# Patient Record
Sex: FEMALE | Race: WHITE
[De-identification: names, ages, dates, MRNs, and addresses within clinical notes are randomized per-mention and may not be internally consistent; named-entity substitution may affect disease eponyms.]

---

## 2018-04-23 ENCOUNTER — HOSPITAL ENCOUNTER (EMERGENCY)
Dept: HOSPITAL 60 - LB.ED | Age: 53
Discharge: HOME | End: 2018-04-23
Payer: COMMERCIAL

## 2018-04-23 DIAGNOSIS — Z88.2: ICD-10-CM

## 2018-04-23 DIAGNOSIS — J01.90: ICD-10-CM

## 2018-04-23 DIAGNOSIS — Z88.1: ICD-10-CM

## 2018-04-23 DIAGNOSIS — R04.0: Primary | ICD-10-CM

## 2018-04-23 PROCEDURE — 99283 EMERGENCY DEPT VISIT LOW MDM: CPT

## 2018-04-23 PROCEDURE — 85025 COMPLETE CBC W/AUTO DIFF WBC: CPT

## 2018-04-23 PROCEDURE — 36415 COLL VENOUS BLD VENIPUNCTURE: CPT

## 2018-04-23 PROCEDURE — 30901 CONTROL OF NOSEBLEED: CPT

## 2018-04-23 NOTE — EDM.PDOC
ED HPI GENERAL MEDICAL PROBLEM





- General


Chief Complaint: ENT Problem


Stated Complaint: Nose Bleed


Time Seen by Provider: 04/23/18 12:25





- Related Data


 Allergies











Allergy/AdvReac Type Severity Reaction Status Date / Time


 


bacitracin Allergy  Hives Verified 04/23/18 12:41


 


sulfamethoxazole Allergy  Hives Verified 04/23/18 12:41





[From Bactrim]     


 


trimethoprim [From Bactrim] Allergy  Hives Verified 04/23/18 12:41











Home Meds: 


 Home Meds





Levofloxacin [Levaquin] 500 mg PO DAILY 10 Days #10 tab 04/23/18 [Rx]











ED ROS ENT





- Review of Systems


Review Of Systems: See Below


Constitutional: Reports: No Symptoms


HEENT: Reports: Nosebleed, Other (Sinus surgery about 4 weeks ago, green/grey 

exudate from sinus this am.)


Respiratory: Reports: No Symptoms


Cardiovascular: Reports: No Symptoms





ED EXAM, ENT





- Physical Exam


Exam: See Below


Exam Limited By: No Limitations


General Appearance: Alert, WD/WN, No Apparent Distress


Nose: Active Bleeding


Mouth/Throat: Normal Inspection, Normal Lips, Normal Oropharynx


Head: Atraumatic, Normocephalic


Neck: Supple, Non-Tender


Respiratory/Chest: No Respiratory Distress


Cardiovascular: Normal Peripheral Pulses


GI/Abdominal: Soft, Non-Tender


Neurological: Alert, Oriented, Normal Cognition, Normal Gait


Psychiatric: Normal Affect


Skin: Warm, Dry, Normal Color





Course





- Vital Signs


Last Recorded V/S: 


 Last Vital Signs











Temp  98.8 F   04/23/18 12:26


 


Pulse  74   04/23/18 13:42


 


Resp  18   04/23/18 13:42


 


BP  136/91 H  04/23/18 13:42


 


Pulse Ox  100   04/23/18 13:42














- Orders/Labs/Meds


Labs: 


 Laboratory Tests











  04/23/18 Range/Units





  13:40 


 


WBC  4.6  (4.0-11.0)  K/uL


 


RBC  4.24  (3.80-5.80)  M/uL


 


Hgb  12.7  (11.5-16.5)  g/dL


 


Hct  37.4  (37.0-47.0)  %


 


MCV  88  (76-96)  fL


 


MCH  30.0  (27.0-32.0)  pg


 


MCHC  34.0  (31.0-35.0)  g/dL


 


RDW  12.7  (11.0-16.0)  %


 


Plt Count  194  (150-500)  K/uL


 


MPV  9.3  (6.0-10.0)  fL


 


Neutrophils % (Manual)  64.0  (45.0-70.0)  %


 


Band Neutrophils %  2.0  %


 


Lymphocytes % (Manual)  24.0  (20.0-40.0)  %


 


Monocytes % (Manual)  8.0  (3.0-10.0)  %


 


Eosinophils % (Manual)  2.0  (1.0-5.0)  %














- Re-Assessments/Exams


Free Text/Narrative Re-Assessment/Exam: 





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  With use of sterile q-tips, 

pushed nasal clot to back of nares.  Saline flush X 2 used to nares.  Pt states 

she felt some of the clot relieved.  Ice applied to nose and pressure applied 

to bridge of nose.  Afrin 2 sprays to each nares used.  Consult w Dr Galloway.  Dr Galloway examined pt and applied nasal packing to right nares.  States to return 

to clinic tomorrow to have packing removed.  Pt had picture on cell phone of 

grey/green thick exudate, from nares this am.  Will get CBC now and start 

Levaquin 500 mg PO X 10 days for sinus infection.  Pt instructed to not blow 

nares today.  





04/23/18 14:54  LE


CBC normal will start Levaquin 1 tablet daily for 10 days.  Start empirically 

for sinus infection  Pt states understanding, discharged and in no acute 

distress.  Neighbor is with him and will drive her home.  She has been in 

contact with her  and ENT for her visit today.    Dr Galloway provided 

education for care of nasal bleeding at home with pinching nasal area for 2 

minutes and then use of Afrin 2 sprays to nostril every 20 minutes X 3.  If 

bleeding persists present to ER for assist.














Departure





- Departure


Time of Disposition: 13:52


Disposition: Home, Self-Care 01


Condition: Good


Clinical Impression: 


 Epistaxis, Acute infection of sinus








- Discharge Information


Prescriptions: 


Levofloxacin [Levaquin] 500 mg PO DAILY 10 Days #10 tab


Referrals: 


PCP,None [Primary Care Provider] - 


Forms:  ED Department Discharge


Additional Instructions: 


Discharge home. Follow up in clinic tomorrow to have packing removed. Levaquin 

prescription called into the pharmacy in [facility]. Follow up with any concerns.